# Patient Record
Sex: FEMALE | URBAN - METROPOLITAN AREA
[De-identification: names, ages, dates, MRNs, and addresses within clinical notes are randomized per-mention and may not be internally consistent; named-entity substitution may affect disease eponyms.]

---

## 2024-09-18 ENCOUNTER — OFFICE VISIT (OUTPATIENT)
Dept: URGENT CARE | Age: 24
End: 2024-09-18
Payer: COMMERCIAL

## 2024-09-18 VITALS
WEIGHT: 168 LBS | HEIGHT: 65 IN | SYSTOLIC BLOOD PRESSURE: 143 MMHG | HEART RATE: 87 BPM | BODY MASS INDEX: 27.99 KG/M2 | DIASTOLIC BLOOD PRESSURE: 59 MMHG | TEMPERATURE: 98.3 F | OXYGEN SATURATION: 99 % | RESPIRATION RATE: 18 BRPM

## 2024-09-18 DIAGNOSIS — B37.31 VAGINAL YEAST INFECTION: Primary | ICD-10-CM

## 2024-09-18 DIAGNOSIS — R30.0 DYSURIA: ICD-10-CM

## 2024-09-18 DIAGNOSIS — R35.0 URINE FREQUENCY: ICD-10-CM

## 2024-09-18 LAB
POC APPEARANCE, URINE: ABNORMAL
POC BILIRUBIN, URINE: NEGATIVE
POC BLOOD, URINE: NEGATIVE
POC COLOR, URINE: YELLOW
POC GLUCOSE, URINE: NEGATIVE MG/DL
POC KETONES, URINE: NEGATIVE MG/DL
POC LEUKOCYTES, URINE: NEGATIVE
POC NITRITE,URINE: NEGATIVE
POC PH, URINE: 6 PH
POC PROTEIN, URINE: NEGATIVE MG/DL
POC SPECIFIC GRAVITY, URINE: 1.02
POC UROBILINOGEN, URINE: 0.2 EU/DL
PREGNANCY TEST URINE, POC: NEGATIVE

## 2024-09-18 RX ORDER — FLUCONAZOLE 150 MG/1
150 TABLET ORAL SEE ADMIN INSTRUCTIONS
Qty: 2 TABLET | Refills: 0 | Status: SHIPPED | OUTPATIENT
Start: 2024-09-18 | End: 2024-09-19

## 2024-09-18 ASSESSMENT — ENCOUNTER SYMPTOMS
NAUSEA: 0
FREQUENCY: 1
DYSURIA: 1
RESPIRATORY NEGATIVE: 1
BACK PAIN: 0
CHILLS: 0
FEVER: 0
VOMITING: 0

## 2024-09-18 ASSESSMENT — PAIN SCALES - GENERAL: PAINLEVEL: 6

## 2024-09-18 NOTE — PATIENT INSTRUCTIONS
Discharge Instructions:   You have been prescribed fluconazole for your vaginal yeast infection. You should take 1 tablet today.  If you still have symptoms in 3 days, you can repeat the dose.     -We will send your urine for culture. We will call you if your antibiotic doesn't treat the bacteria that grew in the culture.   -If you do not feel relief in 24-36 hours, please return or call the clinic so we can change your antibiotic.   -If your symptoms worsen, go to the emergency room for evaluation  -Phenazopyridine (available over the counter as AZO Standard or as a prescription, Pyridium) is for frequency, urgency and bladder spasm relief. It contains orange dye and will stain clothing so wear old underwear while taking. It also can cause nausea if not taken with food.    - Drink a lot of fluid, 3 to 4 quarts a day. Cranberry juice is especially recommended, in addition to large amounts of water.  - Avoid caffeine, tea and carbonated beverages (Coke®, 7-Up®, etc). They tend to irritate the bladder.  - Alcohol may irritate the prostate.  - Aspirin, ibuprofen (Advil® or Motrin®) or acetaminophen (Tylenol®) may be used for temperature and/or discomfort.  -Follow up with PCP within 1 week if symptoms worsen    TO PREVENT FURTHER INFECTIONS:  -Empty the bladder often. Avoid holding urine for long periods of time.  -After a bowel movement, women should cleanse from front to back. Use each tissue only once.  -Empty the bladder before and after sexual intercourse.  -If you develop back pain, fever, nausea (feeling sick to your stomach), vomiting, or your symptoms (problems) are no better in 3 days, return to clinic. Return sooner if you are getting worse.  -You will be notified if your urine culture is positive.     SEEK FURTHER TREATMENT IF YOU:  -Develop severe back pain or lower abdominal pain.  -Unable to urinate.  -Develop chills and fever.  -Develop nausea or vomiting.  -Have continued burning or discomfort with  urination.    You should follow up with your PCP or GYN if you have persistent or recurring symptoms.

## 2024-09-18 NOTE — PROGRESS NOTES
"Subjective   Patient ID: Dipti Kerns is a 23 y.o. female. They present today with a chief complaint of supra-pubic discomfort, troubles urinating, and dysuria.     History of Present Illness  Endorses supra-pubic pain when she woke up this morning as well as some pain with urination.  She reports white discharge recently as well.  She reports she is ovulating per her phone jairon.  Denies foul smelling discharge.  Denies fever, chills, nausea, vomiting, or back pain.   Denies concern for STI.           Past Medical History  Allergies as of 09/18/2024    (No Known Allergies)       (Not in a hospital admission)       No past medical history on file.    No past surgical history on file.     reports that she has never smoked. She has never used smokeless tobacco.    Review of Systems  Review of Systems   Constitutional:  Negative for chills and fever.   Respiratory: Negative.     Gastrointestinal:  Negative for nausea and vomiting.        Supra-pubic abdominal discomfort   Genitourinary:  Positive for dysuria, frequency and vaginal discharge.   Musculoskeletal:  Negative for back pain.   All other systems reviewed and are negative.      Objective    Vitals:    09/18/24 0847   BP: 143/59   Pulse: 87   Resp: 18   Temp: 36.8 °C (98.3 °F)   TempSrc: Oral   SpO2: 99%   Weight: 76.2 kg (168 lb)   Height: 1.651 m (5' 5\")     No LMP recorded.    Physical Exam  GEN: Alert, cooperative, NAD, Vitals Reviewed.   ABD: Soft, NTND. + BS.  No rebound, no guarding.  Mild supra-pubic tenderness/discomfort.  No CVA tenderness.   : Deferred.      Procedures    Point of Care Test & Imaging Results from this visit  Results for orders placed or performed in visit on 09/18/24   POCT UA Automated manually resulted   Result Value Ref Range    POC Color, Urine Yellow Straw, Yellow, Light-Yellow    POC Appearance, Urine Cloudy (A) Clear    POC Glucose, Urine NEGATIVE NEGATIVE mg/dl    POC Bilirubin, Urine NEGATIVE NEGATIVE    POC Ketones, " Urine NEGATIVE NEGATIVE mg/dl    POC Specific Gravity, Urine 1.025 1.005 - 1.035    POC Blood, Urine NEGATIVE NEGATIVE    POC PH, Urine 6.0 No Reference Range Established PH    POC Protein, Urine NEGATIVE NEGATIVE, 30 (1+) mg/dl    POC Urobilinogen, Urine 0.2 0.2, 1.0 EU/DL    Poc Nitrite, Urine NEGATIVE NEGATIVE    POC Leukocytes, Urine NEGATIVE NEGATIVE   POCT pregnancy, urine manually resulted   Result Value Ref Range    Preg Test, Ur Negative Negative      No results found.    Diagnostic study results (if any) were reviewed by Collette Marinelli PA-C.    Assessment/Plan   Allergies, medications, history, and pertinent labs/EKGs/Imaging reviewed by Collette Marinelli PA-C.     Medical Decision Making  -UA negative for leukest or nitrites, but will send urine for culture and sensitivity to confirm.  Will start antibiotics if urine culture positive.   -urine preg negative  -endorses white vaginal discharge which suggestive vaginal yeast infection --> diflucan sent to pharmacy.   -advised to drink plenty of fluids  -RTC/ER precautions advised       Orders and Diagnoses  Diagnoses and all orders for this visit:  Vaginal yeast infection  -     fluconazole (Diflucan) 150 mg tablet; Take 1 tablet (150 mg) by mouth see administration instructions for 1 day. Take one tab now. Repeat in 3 days if symptoms persist.  -     Urine Culture  Dysuria  -     POCT UA Automated manually resulted  -     POCT pregnancy, urine manually resulted  -     Urine Culture  Urine frequency  -     POCT UA Automated manually resulted  -     Urine Culture      Medical Admin Record      Follow Up Instructions  No follow-ups on file.    Patient disposition: Home    Electronically signed by Collette Marinelli PA-C  9:14 AM